# Patient Record
Sex: MALE | Race: WHITE | NOT HISPANIC OR LATINO | Employment: OTHER | URBAN - METROPOLITAN AREA
[De-identification: names, ages, dates, MRNs, and addresses within clinical notes are randomized per-mention and may not be internally consistent; named-entity substitution may affect disease eponyms.]

---

## 2023-11-22 ENCOUNTER — HOSPITAL ENCOUNTER (EMERGENCY)
Facility: HOSPITAL | Age: 73
Discharge: HOME | End: 2023-11-22
Attending: EMERGENCY MEDICINE
Payer: MEDICARE

## 2023-11-22 VITALS
WEIGHT: 255 LBS | OXYGEN SATURATION: 96 % | DIASTOLIC BLOOD PRESSURE: 74 MMHG | TEMPERATURE: 98 F | HEIGHT: 71 IN | BODY MASS INDEX: 35.7 KG/M2 | HEART RATE: 70 BPM | SYSTOLIC BLOOD PRESSURE: 133 MMHG | RESPIRATION RATE: 17 BRPM

## 2023-11-22 DIAGNOSIS — N50.1 SCROTAL BLEEDING: Primary | ICD-10-CM

## 2023-11-22 PROCEDURE — 99281 EMR DPT VST MAYX REQ PHY/QHP: CPT

## 2023-11-22 NOTE — ED PROVIDER NOTES
Encounter Date: 11/22/2023       History     Chief Complaint   Patient presents with    testicular bleeding      X30 mins. Hx prostate cancer      Patient noted blood in his underwear while going to the bathroom.  Patient believes he was scrotal bleeding.  There was no rectal bleeding.  No urethral bleeding.  No similar symptoms in the past.  No blood thinners.  No hematuria blood in stool.  Patient recently was on a flight.      Review of patient's allergies indicates:  No Known Allergies  No past medical history on file.  No past surgical history on file.  No family history on file.     Review of Systems   Constitutional:  Negative for chills and fever.   HENT:  Negative for sore throat.    Gastrointestinal:  Negative for blood in stool and vomiting.   Genitourinary:  Negative for dysuria and hematuria.   Musculoskeletal:  Negative for joint swelling.   Skin:  Negative for rash.   Neurological:  Negative for weakness and headaches.   Psychiatric/Behavioral:  Negative for confusion.        Physical Exam     Initial Vitals [11/22/23 1259]   BP Pulse Resp Temp SpO2   133/74 70 17 98.2 °F (36.8 °C) 96 %      MAP       --         Physical Exam    Nursing note and vitals reviewed.  Constitutional: He is not diaphoretic. No distress.   HENT:   Head: Normocephalic and atraumatic.   Eyes: Conjunctivae are normal.   Neck:   Normal range of motion.  Genitourinary:    Genitourinary Comments: Normal male external genitalia.  There is absolutely no dry or active bleeding.  No erythema or warmth.  No perineal swelling tenderness.     Musculoskeletal:         General: Normal range of motion.      Cervical back: Normal range of motion.     Skin: No rash noted.   No petechiae, purpura, ecchymoses, bleeding gingiva   Psychiatric: He has a normal mood and affect.         ED Course   Procedures  Labs Reviewed - No data to display       Imaging Results    None          Medications - No data to display  Medical Decision Making  Patient  presents with some scrotal bleeding aproximally 30 minutes ago.  Bleeding has completely resolved.  Patient likely with bleeding from superficial vein.  Precautions given if this happens again.  Patient is stable for discharge.                                   Clinical Impression:  Final diagnoses:  [N50.1] Scrotal bleeding (Primary)          ED Disposition Condition    Discharge Stable          ED Prescriptions    None       Follow-up Information       Follow up With Specialties Details Why Contact Info Additional Information    [facility] - Emergency Dept Emergency Medicine  If symptoms worsen [address] [facility] [id number] [phone] 1st floor    Luh Corea MD Urology Schedule an appointment as soon as possible for a visit   [address] [place] [address] [address]  [address]  [facility] [address]  [phone]                Arsen Nicolas MD  11/22/23 0027

## 2023-11-22 NOTE — DISCHARGE INSTRUCTIONS
If bleeding occurs again, life flat and direct compression to bleeding area.  If bleeding does not resolve return to emergency department.